# Patient Record
Sex: FEMALE | Race: WHITE | NOT HISPANIC OR LATINO | URBAN - METROPOLITAN AREA
[De-identification: names, ages, dates, MRNs, and addresses within clinical notes are randomized per-mention and may not be internally consistent; named-entity substitution may affect disease eponyms.]

---
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
## 2024-05-24 ENCOUNTER — APPOINTMENT (OUTPATIENT)
Dept: GENERAL RADIOLOGY | Facility: HOSPITAL | Age: 19
End: 2024-05-24
Payer: MEDICAID

## 2024-05-24 ENCOUNTER — HOSPITAL ENCOUNTER (EMERGENCY)
Facility: HOSPITAL | Age: 19
Discharge: HOME | End: 2024-05-24
Attending: EMERGENCY MEDICINE
Payer: MEDICAID

## 2024-05-24 VITALS
DIASTOLIC BLOOD PRESSURE: 75 MMHG | RESPIRATION RATE: 19 BRPM | OXYGEN SATURATION: 97 % | HEIGHT: 67 IN | TEMPERATURE: 98.5 F | BODY MASS INDEX: 27.34 KG/M2 | WEIGHT: 174.16 LBS | HEART RATE: 76 BPM | SYSTOLIC BLOOD PRESSURE: 107 MMHG

## 2024-05-24 DIAGNOSIS — J45.901 REACTIVE AIRWAY DISEASE WITH ACUTE EXACERBATION, UNSPECIFIED ASTHMA SEVERITY, UNSPECIFIED WHETHER PERSISTENT: Primary | ICD-10-CM

## 2024-05-24 PROCEDURE — 71045 X-RAY EXAM CHEST 1 VIEW: CPT

## 2024-05-24 PROCEDURE — 99283 EMERGENCY DEPT VISIT LOW MDM: CPT

## 2024-05-24 PROCEDURE — 94640 AIRWAY INHALATION TREATMENT: CPT

## 2024-05-24 PROCEDURE — 94799 UNLISTED PULMONARY SVC/PX: CPT

## 2024-05-24 RX ORDER — ALBUTEROL SULFATE 90 UG/1
2 AEROSOL, METERED RESPIRATORY (INHALATION) EVERY 4 HOURS PRN
Qty: 18 G | Refills: 0 | Status: SHIPPED | OUTPATIENT
Start: 2024-05-24

## 2024-05-24 RX ORDER — ALBUTEROL SULFATE 2.5 MG/3ML
2.5 SOLUTION RESPIRATORY (INHALATION) ONCE
Status: COMPLETED | OUTPATIENT
Start: 2024-05-24 | End: 2024-05-24

## 2024-05-24 RX ADMIN — ALBUTEROL SULFATE 2.5 MG: 2.5 SOLUTION RESPIRATORY (INHALATION) at 01:47

## 2024-05-24 NOTE — ED PROVIDER NOTES
"Subjective   History of Present Illness  18-year-old female presents for cough and wheezing going on for few days.  No fevers.  No chills.  Cough mostly nonproductive.  States had history of asthma as a child but has not had any problems with it in quite some time.  Review of Systems  See HPI.  No past medical history on file.    Allergies   Allergen Reactions    Penicillins Unknown - High Severity       No past surgical history on file.    No family history on file.    Social History     Socioeconomic History    Marital status: Single   Tobacco Use    Smoking status: Never    Smokeless tobacco: Never   Vaping Use    Vaping status: Former    Quit date: 6/19/2023   Substance and Sexual Activity    Alcohol use: Never    Drug use: Not Currently     Types: Marijuana    Sexual activity: Yes           Objective   Physical Exam  No acute distress, regular rate and rhythm, slight expiratory wheezes on right and none on left, good air movement bilaterally, no lower extremity edema, moist oral mucosa, no rhinorrhea present.  Procedures           ED Course      /75   Pulse 76   Temp 98.4 °F (36.9 °C)   Resp 20   Ht 170.2 cm (67\")   Wt 79 kg (174 lb 2.6 oz)   LMP 05/17/2024   SpO2 97%   BMI 27.28 kg/m²   Labs Reviewed - No data to display  Medications   albuterol (PROVENTIL) nebulizer solution 0.083% 2.5 mg/3mL (2.5 mg Nebulization Given 5/24/24 0147)     XR Chest 1 View    Result Date: 5/24/2024  Impression: No active disease Electronically Signed: Cedrick Alvarado MD  5/24/2024 1:38 AM EDT  Workstation ID: [id number]                                          Medical Decision Making  Problems Addressed:  Reactive airway disease with acute exacerbation, unspecified asthma severity, unspecified whether persistent: complicated acute illness or injury    Amount and/or Complexity of Data Reviewed  Radiology: ordered.    Risk  Prescription drug management.    My interpretation chest x-ray is no focal infiltrate or " pneumothorax.  See system for radiology interpretation.    Patient states wheezing resolved and she feels much better after breathing treatment.  Will DC with inhaler.  Reactive airway disease with unknown trigger.  Will place referral to PCP as patient does not currently have 1.    Final diagnoses:   Reactive airway disease with acute exacerbation, unspecified asthma severity, unspecified whether persistent       ED Disposition  ED Disposition       ED Disposition   Discharge    Condition   Stable    Comment   --               PATIENT CONNECTION - [facility] [id number] [phone]  In 3 days           Medication List        New Prescriptions      albuterol sulfate  (90 Base) MCG/ACT inhaler  Commonly known as: PROVENTIL HFA;VENTOLIN HFA;PROAIR HFA  Inhale 2 puffs Every 4 (Four) Hours As Needed for Wheezing.               Where to Get Your Medications        These medications were sent to [facility] #[id number] - [facility] [place] - [address] AT [address] OF [facility] [address] - [phone]  - [phone]   [address] [facility] [address] [facility] [place] [address]      Phone: [phone]   albuterol sulfate  (90 Base) MCG/ACT inhaler            Billy Bassett MD  05/25/24 1215

## 2024-06-13 ENCOUNTER — HOSPITAL ENCOUNTER (EMERGENCY)
Facility: HOSPITAL | Age: 19
Discharge: HOME | End: 2024-06-13
Payer: MEDICAID

## 2024-06-13 VITALS
WEIGHT: 172.18 LBS | HEART RATE: 85 BPM | RESPIRATION RATE: 20 BRPM | BODY MASS INDEX: 27.02 KG/M2 | HEIGHT: 67 IN | SYSTOLIC BLOOD PRESSURE: 108 MMHG | TEMPERATURE: 98.2 F | OXYGEN SATURATION: 98 % | DIASTOLIC BLOOD PRESSURE: 69 MMHG

## 2024-06-13 DIAGNOSIS — J45.909 ASTHMA, UNSPECIFIED ASTHMA SEVERITY, UNSPECIFIED WHETHER COMPLICATED, UNSPECIFIED WHETHER PERSISTENT: Primary | ICD-10-CM

## 2024-06-13 PROCEDURE — 93005 ELECTROCARDIOGRAM TRACING: CPT

## 2024-06-13 PROCEDURE — 99283 EMERGENCY DEPT VISIT LOW MDM: CPT

## 2024-06-13 PROCEDURE — 94640 AIRWAY INHALATION TREATMENT: CPT

## 2024-06-13 PROCEDURE — 94664 DEMO&/EVAL PT USE INHALER: CPT

## 2024-06-13 PROCEDURE — 94761 N-INVAS EAR/PLS OXIMETRY MLT: CPT

## 2024-06-13 PROCEDURE — 94799 UNLISTED PULMONARY SVC/PX: CPT

## 2024-06-13 RX ORDER — IPRATROPIUM BROMIDE AND ALBUTEROL SULFATE 2.5; .5 MG/3ML; MG/3ML
3 SOLUTION RESPIRATORY (INHALATION) ONCE
Status: COMPLETED | OUTPATIENT
Start: 2024-06-13 | End: 2024-06-13

## 2024-06-13 RX ORDER — ALBUTEROL SULFATE 90 UG/1
2 AEROSOL, METERED RESPIRATORY (INHALATION) EVERY 4 HOURS PRN
Qty: 6.7 G | Refills: 0 | Status: SHIPPED | OUTPATIENT
Start: 2024-06-13

## 2024-06-13 RX ADMIN — IPRATROPIUM BROMIDE AND ALBUTEROL SULFATE 3 ML: .5; 3 SOLUTION RESPIRATORY (INHALATION) at 07:49

## 2024-06-13 NOTE — DISCHARGE INSTRUCTIONS
Use your inhaler as needed for wheezing    Call the above numbers to establish with a new primary care provider along with pulmonary for follow-up and further management of your asthma    Return with any new or worsening symptoms

## 2024-06-13 NOTE — ED PROVIDER NOTES
Subjective   History of Present Illness  89-year-old female presents the ED with complaints of shortness of breath that started around 3 AM this morning.  States he has a history of asthma and this feels very similar to that.  Denies any chest pain, cough, congestion, fever, nausea, vomiting.  States she was recently here but has lost her inhaler.  Also denies any primary care provider        Review of Systems   Respiratory:  Positive for shortness of breath and wheezing.        Past Medical History:   Diagnosis Date    Asthma        Allergies   Allergen Reactions    Penicillins Unknown - High Severity       History reviewed. No pertinent surgical history.    History reviewed. No pertinent family history.    Social History     Socioeconomic History    Marital status: Single   Tobacco Use    Smoking status: Never    Smokeless tobacco: Never   Vaping Use    Vaping status: Former    Quit date: 6/19/2023   Substance and Sexual Activity    Alcohol use: Never    Drug use: Not Currently     Types: Marijuana    Sexual activity: Yes           Objective   Physical Exam  Vitals and nursing note reviewed.   Constitutional:       Appearance: Normal appearance.   HENT:      Head: Normocephalic and atraumatic.   Eyes:      Extraocular Movements: Extraocular movements intact.   Cardiovascular:      Rate and Rhythm: Normal rate and regular rhythm.      Pulses: Normal pulses.      Heart sounds: Normal heart sounds.   Pulmonary:      Effort: Pulmonary effort is normal.      Breath sounds: Wheezing present.      Comments: Expiratory wheezes bilaterally  Musculoskeletal:         General: Normal range of motion.      Cervical back: Normal range of motion.   Skin:     General: Skin is warm and dry.   Neurological:      Mental Status: She is alert and oriented to person, place, and time.   Psychiatric:         Mood and Affect: Mood normal.         Behavior: Behavior normal.         Thought Content: Thought content normal.         Judgment:  "Judgment normal.         Procedures      EKG independently interpreted by Dr. Melendez as sinus rhythm at a rate of 67 as compared to previous on 7/31/2023 that was sinus rhythm with a rate of 87.           ED Course      /69   Pulse 85   Temp 98.2 °F (36.8 °C) (Oral)   Resp 20   Ht 170.2 cm (67\")   Wt 78.1 kg (172 lb 2.9 oz)   LMP 05/17/2024   SpO2 98%   BMI 26.97 kg/m²   Labs Reviewed - No data to display  Medications   ipratropium-albuterol (DUO-NEB) nebulizer solution 3 mL (3 mL Nebulization Given 6/13/24 0749)     No radiology results for the last day                                         Medical Decision Making  Patient seen for the above complaint, shortness of breath with her asthma.  Patient states that she recently lost her inhaler.  Upon chart review patient was recently seen here on May 24 for this and was given a note and primary care follow-up.  Patient also received an x-ray at that time which showed no active disease.  New chest x-ray was considered however not felt to be emergently warranted as patient states this feels like her past asthma flares.  Also consider lab work however also not felt to be emergently warranted at this time.  Patient also had mild wheezing expiratory bilaterally.  Pre and post DuoNeb peak flows were obtained per respiratory, patient was 300 prior to DuoNeb and then 410 afterwards.  On reassessment patient states she feels significantly better, breath sounds clear bilaterally without wheezing present.  Reordered her albuterol inhaler to be used as needed along with primary care and pulmonary referrals for follow-up.  Patient verbalized understanding and were agreeable with disposition at this time.    I discussed findings with patient who voices understanding of discharge instructions, signs and symptoms requiring return to ED; discharged improved and in stable condition with follow up for re-evaluation.  This document is intended for medical expert use only. " Reading of this document by patients and/or patient's family without participating medical staff guidance may result in misinterpretation and unintended morbidity.  Any interpretation of such data is the responsibility of the patient and/or family member responsible for the patient in concert with their primary or specialist providers, not to be left for sources of online searches such as ????, [employer] or similar queries. Relying on these approaches to knowledge may result in misinterpretation, misguided goals of care and even death should patients or family members try recommendations outside of the realm of professional medical care in a supervised inpatient environment.     Risk  Prescription drug management.        Final diagnoses:   Asthma, unspecified asthma severity, unspecified whether complicated, unspecified whether persistent       ED Disposition  ED Disposition       ED Disposition   Discharge    Condition   Stable    Comment   --               family connections  [phone]  Schedule an appointment as soon as possible for a visit       Aquilino Parish MD  [address] [place] [address] [place] [address] [phone]    Schedule an appointment as soon as possible for a visit            Where to Get Your Medications        These medications were sent to [facility] [address] [place] [facility] [place] - [address] [phone] [place] [phone] [place]  [address] [facility] [place] [address]      Phone: [phone]   albuterol sulfate  (90 Base) MCG/ACT inhaler          Medication List      No changes were made to your prescriptions during this visit.            Sherri Hanna, APRN  06/13/24 5536

## 2024-06-14 LAB
QT INTERVAL: 376 MS
QTC INTERVAL: 396 MS

## 2024-07-06 ENCOUNTER — HOSPITAL ENCOUNTER (EMERGENCY)
Facility: HOSPITAL | Age: 19
Discharge: HOME | End: 2024-07-06
Attending: EMERGENCY MEDICINE
Payer: MEDICAID

## 2024-07-06 VITALS
RESPIRATION RATE: 15 BRPM | WEIGHT: 173.5 LBS | SYSTOLIC BLOOD PRESSURE: 123 MMHG | BODY MASS INDEX: 27.23 KG/M2 | OXYGEN SATURATION: 98 % | TEMPERATURE: 97.9 F | DIASTOLIC BLOOD PRESSURE: 74 MMHG | HEART RATE: 84 BPM | HEIGHT: 67 IN

## 2024-07-06 DIAGNOSIS — H66.92 LEFT OTITIS MEDIA, UNSPECIFIED OTITIS MEDIA TYPE: Primary | ICD-10-CM

## 2024-07-06 PROCEDURE — 99282 EMERGENCY DEPT VISIT SF MDM: CPT

## 2024-07-06 RX ORDER — AZITHROMYCIN 250 MG/1
TABLET, FILM COATED ORAL
Qty: 6 TABLET | Refills: 0 | Status: SHIPPED | OUTPATIENT
Start: 2024-07-06

## 2024-07-11 NOTE — DISCHARGE INSTRUCTIONS
Rest, drink plenty of fluids, stop smoking.  Return for increased shortness of breath, persistent vomiting, high fever or any other concerns   Yes

## 2024-09-05 ENCOUNTER — HOSPITAL ENCOUNTER (EMERGENCY)
Facility: HOSPITAL | Age: 19
Discharge: HOME | End: 2024-09-05
Attending: EMERGENCY MEDICINE
Payer: MEDICAID

## 2024-09-05 VITALS
OXYGEN SATURATION: 96 % | HEIGHT: 68 IN | HEART RATE: 88 BPM | DIASTOLIC BLOOD PRESSURE: 61 MMHG | TEMPERATURE: 98 F | SYSTOLIC BLOOD PRESSURE: 103 MMHG | BODY MASS INDEX: 26.1 KG/M2 | WEIGHT: 172.18 LBS | RESPIRATION RATE: 16 BRPM

## 2024-09-05 DIAGNOSIS — J45.901 MODERATE ASTHMA WITH EXACERBATION, UNSPECIFIED WHETHER PERSISTENT: Primary | ICD-10-CM

## 2024-09-05 DIAGNOSIS — U07.1 COVID-19: ICD-10-CM

## 2024-09-05 LAB
B PARAPERT DNA SPEC QL NAA+PROBE: NOT DETECTED
B PERT DNA SPEC QL NAA+PROBE: NOT DETECTED
C PNEUM DNA NPH QL NAA+NON-PROBE: NOT DETECTED
FLUAV SUBTYP SPEC NAA+PROBE: NOT DETECTED
FLUBV RNA ISLT QL NAA+PROBE: NOT DETECTED
HADV DNA SPEC NAA+PROBE: NOT DETECTED
HCOV 229E RNA SPEC QL NAA+PROBE: NOT DETECTED
HCOV HKU1 RNA SPEC QL NAA+PROBE: NOT DETECTED
HCOV NL63 RNA SPEC QL NAA+PROBE: NOT DETECTED
HCOV OC43 RNA SPEC QL NAA+PROBE: NOT DETECTED
HMPV RNA NPH QL NAA+NON-PROBE: NOT DETECTED
HPIV1 RNA ISLT QL NAA+PROBE: NOT DETECTED
HPIV2 RNA SPEC QL NAA+PROBE: NOT DETECTED
HPIV3 RNA NPH QL NAA+PROBE: NOT DETECTED
HPIV4 P GENE NPH QL NAA+PROBE: NOT DETECTED
M PNEUMO IGG SER IA-ACNC: NOT DETECTED
RHINOVIRUS RNA SPEC NAA+PROBE: DETECTED
RSV RNA NPH QL NAA+NON-PROBE: NOT DETECTED
SARS-COV-2 RNA NPH QL NAA+NON-PROBE: DETECTED

## 2024-09-05 PROCEDURE — 99283 EMERGENCY DEPT VISIT LOW MDM: CPT

## 2024-09-05 PROCEDURE — 63710000001 ONDANSETRON ODT 4 MG TABLET DISPERSIBLE: Performed by: EMERGENCY MEDICINE

## 2024-09-05 PROCEDURE — 0202U NFCT DS 22 TRGT SARS-COV-2: CPT | Performed by: EMERGENCY MEDICINE

## 2024-09-05 PROCEDURE — 94640 AIRWAY INHALATION TREATMENT: CPT

## 2024-09-05 PROCEDURE — 94799 UNLISTED PULMONARY SVC/PX: CPT

## 2024-09-05 PROCEDURE — 93005 ELECTROCARDIOGRAM TRACING: CPT | Performed by: EMERGENCY MEDICINE

## 2024-09-05 PROCEDURE — 63710000001 PREDNISONE PER 5 MG: Performed by: EMERGENCY MEDICINE

## 2024-09-05 PROCEDURE — 93005 ELECTROCARDIOGRAM TRACING: CPT

## 2024-09-05 RX ORDER — ALBUTEROL SULFATE 0.83 MG/ML
2.5 SOLUTION RESPIRATORY (INHALATION) EVERY 4 HOURS PRN
Qty: 90 ML | Refills: 0 | Status: SHIPPED | OUTPATIENT
Start: 2024-09-05

## 2024-09-05 RX ORDER — ACETAMINOPHEN 500 MG
1000 TABLET ORAL ONCE
Status: COMPLETED | OUTPATIENT
Start: 2024-09-05 | End: 2024-09-05

## 2024-09-05 RX ORDER — ALBUTEROL SULFATE 90 UG/1
2 AEROSOL, METERED RESPIRATORY (INHALATION) EVERY 4 HOURS PRN
Qty: 8 G | Refills: 3 | Status: SHIPPED | OUTPATIENT
Start: 2024-09-05

## 2024-09-05 RX ORDER — ALBUTEROL SULFATE 0.83 MG/ML
2.5 SOLUTION RESPIRATORY (INHALATION) ONCE
Status: COMPLETED | OUTPATIENT
Start: 2024-09-05 | End: 2024-09-05

## 2024-09-05 RX ORDER — PREDNISONE 20 MG/1
20 TABLET ORAL 2 TIMES DAILY
Qty: 10 TABLET | Refills: 0 | OUTPATIENT
Start: 2024-09-05 | End: 2024-09-07

## 2024-09-05 RX ORDER — ONDANSETRON 4 MG/1
4 TABLET, ORALLY DISINTEGRATING ORAL ONCE
Status: COMPLETED | OUTPATIENT
Start: 2024-09-05 | End: 2024-09-05

## 2024-09-05 RX ADMIN — PREDNISONE 60 MG: 50 TABLET ORAL at 04:09

## 2024-09-05 RX ADMIN — ALBUTEROL SULFATE 2.5 MG: 2.5 SOLUTION RESPIRATORY (INHALATION) at 04:12

## 2024-09-05 RX ADMIN — ACETAMINOPHEN 1000 MG: 500 TABLET, FILM COATED ORAL at 04:09

## 2024-09-05 RX ADMIN — ONDANSETRON 4 MG: 4 TABLET, ORALLY DISINTEGRATING ORAL at 04:09

## 2024-09-05 NOTE — ED PROVIDER NOTES
Subjective   History of Present Illness  19-year-old female with a history of asthma here with suspected exacerbation from URI for the past 2 days.  Patient has had some fever, headache, nausea, chest soreness from cough.      Review of Systems   Constitutional:  Positive for fever.   HENT:  Positive for congestion.    Respiratory:  Positive for cough, shortness of breath and wheezing.    Cardiovascular:  Positive for chest pain.   Gastrointestinal:  Positive for nausea.   Neurological:  Positive for headaches.       Past Medical History:   Diagnosis Date    Asthma        Allergies   Allergen Reactions    Penicillins Unknown - High Severity       No past surgical history on file.    No family history on file.    Social History     Socioeconomic History    Marital status: Single   Tobacco Use    Smoking status: Never    Smokeless tobacco: Never   Vaping Use    Vaping status: Former    Quit date: 6/19/2023   Substance and Sexual Activity    Alcohol use: Never    Drug use: Not Currently     Types: Marijuana    Sexual activity: Yes           Objective   Physical Exam  Constitutional:       Appearance: She is well-developed.   HENT:      Head: Normocephalic and atraumatic.      Nose: Congestion present.      Mouth/Throat:      Mouth: Mucous membranes are moist.      Pharynx: Oropharynx is clear.   Eyes:      Conjunctiva/sclera: Conjunctivae normal.      Pupils: Pupils are equal, round, and reactive to light.   Cardiovascular:      Rate and Rhythm: Normal rate and regular rhythm.   Pulmonary:      Effort: Pulmonary effort is normal.      Comments: Wheezes and rhonchi bilaterally  Musculoskeletal:      Comments: Calves nontender without edema bilaterally   Skin:     General: Skin is warm and dry.      Capillary Refill: Capillary refill takes less than 2 seconds.   Neurological:      General: No focal deficit present.      Mental Status: She is alert and oriented to person, place, and time.         Procedures           ED  Course                                             Medical Decision Making  On reevaluation lungs clear, patient feels much better found to have COVID with associated bronchospasm, will DC on steroids and albuterol    Problems Addressed:  COVID-19: complicated acute illness or injury  Moderate asthma with exacerbation, unspecified whether persistent: complicated acute illness or injury    Amount and/or Complexity of Data Reviewed  Labs: ordered.     Details: COVID-positive  ECG/medicine tests: ordered and independent interpretation performed.     Details: EKG interpretation: Normal sinus rhythm, rate 87, no acute ST elevation    Risk  OTC drugs.  Prescription drug management.        Final diagnoses:   Moderate asthma with exacerbation, unspecified whether persistent   COVID-19       ED Disposition  ED Disposition       ED Disposition   Discharge    Condition   Stable    Comment   --               PATIENT CONNECTION - [place] [address]  [phone]  Schedule an appointment as soon as possible for a visit            Medication List        New Prescriptions      Unable to find  Albuterol nebulizer  Dispense 1    Diagnosis asthma exacerbation/wheezing            Changed      * albuterol sulfate  (90 Base) MCG/ACT inhaler  Commonly known as: PROVENTIL HFA;VENTOLIN HFA;PROAIR HFA  Inhale 2 puffs Every 4 (Four) Hours As Needed for Wheezing.  What changed: Another medication with the same name was added. Make sure you understand how and when to take each.     * albuterol sulfate  (90 Base) MCG/ACT inhaler  Commonly known as: PROVENTIL HFA;VENTOLIN HFA;PROAIR HFA  Inhale 2 puffs Every 4 (Four) Hours As Needed for Wheezing or Shortness of Air.  What changed: You were already taking a medication with the same name, and this prescription was added. Make sure you understand how and when to take each.     * albuterol (2.5 MG/3ML) 0.083% nebulizer solution  Commonly known as: PROVENTIL  Take 2.5 mg by  nebulization Every 4 (Four) Hours As Needed for Wheezing or Shortness of Air.  What changed: You were already taking a medication with the same name, and this prescription was added. Make sure you understand how and when to take each.     * predniSONE 20 MG tablet  Commonly known as: DELTASONE  Take 1 tablet by mouth Daily.  What changed: Another medication with the same name was added. Make sure you understand how and when to take each.     * predniSONE 20 MG tablet  Commonly known as: DELTASONE  Take 1 tablet by mouth Daily.  What changed: Another medication with the same name was added. Make sure you understand how and when to take each.     * predniSONE 20 MG tablet  Commonly known as: DELTASONE  Take 1 tablet by mouth 2 (Two) Times a Day.  What changed: You were already taking a medication with the same name, and this prescription was added. Make sure you understand how and when to take each.           * This list has 6 medication(s) that are the same as other medications prescribed for you. Read the directions carefully, and ask your doctor or other care provider to review them with you.                   Where to Get Your Medications        These medications were sent to [facility] [address] [place] [facility] [place] - [address] [facility] [address] [phone] [facility] [phone]   [address] [facility] [place] [address]      Phone: [phone]   albuterol (2.5 MG/3ML) 0.083% nebulizer solution  albuterol sulfate  (90 Base) MCG/ACT inhaler  predniSONE 20 MG tablet       You can get these medications from any pharmacy    Bring a paper prescription for each of these medications  Unable to find            Sidney Reynolds MD  09/05/24 0778

## 2024-09-05 NOTE — Clinical Note
[facility] EMERGENCY DEPARTMENT  [address] [facility] [place] [address]  Phone: [phone]    Tio Eng was seen and treated in our emergency department on 9/5/2024.  She may return to work on 09/09/2024.         Thank you for choosing [facility].    Sidney Reynolds MD

## 2024-09-06 LAB
QT INTERVAL: 327 MS
QTC INTERVAL: 393 MS

## 2024-09-07 ENCOUNTER — HOSPITAL ENCOUNTER (EMERGENCY)
Facility: HOSPITAL | Age: 19
Discharge: HOME | End: 2024-09-07
Attending: EMERGENCY MEDICINE
Payer: MEDICAID

## 2024-09-07 ENCOUNTER — APPOINTMENT (OUTPATIENT)
Dept: GENERAL RADIOLOGY | Facility: HOSPITAL | Age: 19
End: 2024-09-07
Payer: MEDICAID

## 2024-09-07 VITALS
BODY MASS INDEX: 26.7 KG/M2 | HEART RATE: 129 BPM | SYSTOLIC BLOOD PRESSURE: 108 MMHG | RESPIRATION RATE: 20 BRPM | TEMPERATURE: 98.1 F | DIASTOLIC BLOOD PRESSURE: 59 MMHG | OXYGEN SATURATION: 99 % | HEIGHT: 68 IN | WEIGHT: 176.15 LBS

## 2024-09-07 DIAGNOSIS — J45.901 ASTHMA WITH ACUTE EXACERBATION, UNSPECIFIED ASTHMA SEVERITY, UNSPECIFIED WHETHER PERSISTENT: Primary | ICD-10-CM

## 2024-09-07 LAB
ALBUMIN SERPL-MCNC: 4.2 G/DL (ref 3.5–5.2)
ALBUMIN/GLOB SERPL: 1.8 G/DL
ALP SERPL-CCNC: 59 U/L (ref 39–117)
ALT SERPL W P-5'-P-CCNC: 25 U/L (ref 1–33)
ANION GAP SERPL CALCULATED.3IONS-SCNC: 11 MMOL/L (ref 5–15)
AST SERPL-CCNC: 30 U/L (ref 1–32)
BASOPHILS # BLD AUTO: 0.03 10*3/MM3 (ref 0–0.2)
BASOPHILS NFR BLD AUTO: 0.3 % (ref 0–1.5)
BILIRUB SERPL-MCNC: 0.3 MG/DL (ref 0–1.2)
BUN SERPL-MCNC: 6 MG/DL (ref 6–20)
BUN/CREAT SERPL: 8.7 (ref 7–25)
CALCIUM SPEC-SCNC: 9.1 MG/DL (ref 8.6–10.5)
CHLORIDE SERPL-SCNC: 105 MMOL/L (ref 98–107)
CO2 SERPL-SCNC: 23 MMOL/L (ref 22–29)
CREAT SERPL-MCNC: 0.69 MG/DL (ref 0.57–1)
DEPRECATED RDW RBC AUTO: 44.3 FL (ref 37–54)
EGFRCR SERPLBLD CKD-EPI 2021: 127.6 ML/MIN/1.73
EOSINOPHIL # BLD AUTO: 0.34 10*3/MM3 (ref 0–0.4)
EOSINOPHIL NFR BLD AUTO: 3.1 % (ref 0.3–6.2)
ERYTHROCYTE [DISTWIDTH] IN BLOOD BY AUTOMATED COUNT: 13.3 % (ref 12.3–15.4)
GLOBULIN UR ELPH-MCNC: 2.4 GM/DL
GLUCOSE SERPL-MCNC: 82 MG/DL (ref 65–99)
HCT VFR BLD AUTO: 37.4 % (ref 34–46.6)
HGB BLD-MCNC: 12.1 G/DL (ref 12–15.9)
IMM GRANULOCYTES # BLD AUTO: 0.05 10*3/MM3 (ref 0–0.05)
IMM GRANULOCYTES NFR BLD AUTO: 0.5 % (ref 0–0.5)
LYMPHOCYTES # BLD AUTO: 2.01 10*3/MM3 (ref 0.7–3.1)
LYMPHOCYTES NFR BLD AUTO: 18.5 % (ref 19.6–45.3)
MCH RBC QN AUTO: 29.2 PG (ref 26.6–33)
MCHC RBC AUTO-ENTMCNC: 32.4 G/DL (ref 31.5–35.7)
MCV RBC AUTO: 90.1 FL (ref 79–97)
MONOCYTES # BLD AUTO: 0.92 10*3/MM3 (ref 0.1–0.9)
MONOCYTES NFR BLD AUTO: 8.5 % (ref 5–12)
NEUTROPHILS NFR BLD AUTO: 69.1 % (ref 42.7–76)
NEUTROPHILS NFR BLD AUTO: 7.52 10*3/MM3 (ref 1.7–7)
NRBC BLD AUTO-RTO: 0 /100 WBC (ref 0–0.2)
PLATELET # BLD AUTO: 242 10*3/MM3 (ref 140–450)
PMV BLD AUTO: 10.4 FL (ref 6–12)
POTASSIUM SERPL-SCNC: 3.9 MMOL/L (ref 3.5–5.2)
PROT SERPL-MCNC: 6.6 G/DL (ref 6–8.5)
RBC # BLD AUTO: 4.15 10*6/MM3 (ref 3.77–5.28)
SODIUM SERPL-SCNC: 139 MMOL/L (ref 136–145)
WBC NRBC COR # BLD AUTO: 10.87 10*3/MM3 (ref 3.4–10.8)

## 2024-09-07 PROCEDURE — 99284 EMERGENCY DEPT VISIT MOD MDM: CPT

## 2024-09-07 PROCEDURE — 93005 ELECTROCARDIOGRAM TRACING: CPT | Performed by: EMERGENCY MEDICINE

## 2024-09-07 PROCEDURE — 71045 X-RAY EXAM CHEST 1 VIEW: CPT

## 2024-09-07 PROCEDURE — 93005 ELECTROCARDIOGRAM TRACING: CPT

## 2024-09-07 PROCEDURE — 25010000002 DEXAMETHASONE SODIUM PHOSPHATE 10 MG/ML SOLUTION: Performed by: EMERGENCY MEDICINE

## 2024-09-07 PROCEDURE — 80053 COMPREHEN METABOLIC PANEL: CPT | Performed by: EMERGENCY MEDICINE

## 2024-09-07 PROCEDURE — 85025 COMPLETE CBC W/AUTO DIFF WBC: CPT | Performed by: EMERGENCY MEDICINE

## 2024-09-07 PROCEDURE — 96374 THER/PROPH/DIAG INJ IV PUSH: CPT

## 2024-09-07 PROCEDURE — 94799 UNLISTED PULMONARY SVC/PX: CPT

## 2024-09-07 PROCEDURE — 94640 AIRWAY INHALATION TREATMENT: CPT

## 2024-09-07 RX ORDER — ALBUTEROL SULFATE 90 UG/1
6 AEROSOL, METERED RESPIRATORY (INHALATION) ONCE
Status: COMPLETED | OUTPATIENT
Start: 2024-09-07 | End: 2024-09-07

## 2024-09-07 RX ORDER — DEXAMETHASONE SODIUM PHOSPHATE 10 MG/ML
10 INJECTION, SOLUTION INTRAMUSCULAR; INTRAVENOUS ONCE
Status: COMPLETED | OUTPATIENT
Start: 2024-09-07 | End: 2024-09-07

## 2024-09-07 RX ORDER — ALBUTEROL SULFATE 0.83 MG/ML
10 SOLUTION RESPIRATORY (INHALATION)
Status: DISCONTINUED | OUTPATIENT
Start: 2024-09-07 | End: 2024-09-07

## 2024-09-07 RX ORDER — METHYLPREDNISOLONE 4 MG
TABLET, DOSE PACK ORAL
Qty: 21 TABLET | Refills: 0 | Status: SHIPPED | OUTPATIENT
Start: 2024-09-07

## 2024-09-07 RX ORDER — SODIUM CHLORIDE 0.9 % (FLUSH) 0.9 %
10 SYRINGE (ML) INJECTION AS NEEDED
Status: DISCONTINUED | OUTPATIENT
Start: 2024-09-07 | End: 2024-09-07 | Stop reason: HOSPADM

## 2024-09-07 RX ORDER — ALBUTEROL SULFATE 0.83 MG/ML
10 SOLUTION RESPIRATORY (INHALATION)
Status: COMPLETED | OUTPATIENT
Start: 2024-09-07 | End: 2024-09-07

## 2024-09-07 RX ADMIN — DEXAMETHASONE SODIUM PHOSPHATE 10 MG: 10 INJECTION, SOLUTION INTRAMUSCULAR; INTRAVENOUS at 11:09

## 2024-09-07 RX ADMIN — ALBUTEROL SULFATE 6 PUFF: 108 AEROSOL, METERED RESPIRATORY (INHALATION) at 11:01

## 2024-09-07 RX ADMIN — ALBUTEROL SULFATE 10 MG: 2.5 SOLUTION RESPIRATORY (INHALATION) at 11:26

## 2024-09-07 NOTE — ED PROVIDER NOTES
"Subjective   History of Present Illness  19-year-old female presents for concern for asthma exacerbation.  Has had more wheezing and coughing.  Been using her albuterol every 4-6 hours and not helping.  Diagnosed with COVID and RSV 2 days ago.  Review of Systems  See HPI.  Past Medical History:   Diagnosis Date    Asthma        Allergies   Allergen Reactions    Penicillins Unknown - High Severity       No past surgical history on file.    No family history on file.    Social History     Socioeconomic History    Marital status: Single   Tobacco Use    Smoking status: Never    Smokeless tobacco: Never   Vaping Use    Vaping status: Former    Quit date: 6/19/2023   Substance and Sexual Activity    Alcohol use: Never    Drug use: Not Currently     Types: Marijuana    Sexual activity: Yes           Objective   Physical Exam  No acute distress, mild tachypnea, bilateral expiratory wheezes and rales, erythematous oropharynx with midline uvula no mass effect no exudate, tachycardic rate, abdomen soft nontender.  Procedures           ED Course      /59   Pulse (!) 129   Temp 98.1 °F (36.7 °C) (Oral)   Resp 20   Ht 172.7 cm (68\")   Wt 79.9 kg (176 lb 2.4 oz)   LMP 08/21/2024 (Approximate)   SpO2 99%   BMI 26.78 kg/m²   Labs Reviewed   CBC WITH AUTO DIFFERENTIAL - Abnormal; Notable for the following components:       Result Value    WBC 10.87 (*)     Lymphocyte % 18.5 (*)     Neutrophils, Absolute 7.52 (*)     Monocytes, Absolute 0.92 (*)     All other components within normal limits   COMPREHENSIVE METABOLIC PANEL    Narrative:     GFR Normal >60  Chronic Kidney Disease <60  Kidney Failure <15     CBC AND DIFFERENTIAL    Narrative:     The following orders were created for panel order CBC & Differential.  Procedure                               Abnormality         Status                     ---------                               -----------         ------                     CBC Auto Differential[[id number]]     "    Abnormal            Final result                 Please view results for these tests on the individual orders.     Medications   sodium chloride 0.9 % flush 10 mL (has no administration in time range)   dexAMETHasone sodium phosphate injection 10 mg (10 mg Intravenous Given 9/7/24 1109)   albuterol sulfate HFA (PROVENTIL HFA;VENTOLIN HFA;PROAIR HFA) inhaler 6 puff (6 puffs Inhalation Given 9/7/24 1101)   albuterol (PROVENTIL) nebulizer solution 0.083% 2.5 mg/3mL (10 mg Nebulization Given 9/7/24 1126)     XR Chest 1 View    Result Date: 9/7/2024  Impression: No active disease. Electronically Signed: Sathya Ruelas MD  9/7/2024 10:59 AM EDT  Workstation ID: [id number]                                          Medical Decision Making  Problems Addressed:  Asthma with acute exacerbation, unspecified asthma severity, unspecified whether persistent: complicated acute illness or injury    Amount and/or Complexity of Data Reviewed  Labs: ordered.  Radiology: ordered.  ECG/medicine tests: ordered.    Risk  Prescription drug management.      My interpretation of chest x-ray is no focal infiltrate or pneumothorax.  See system for radiology interpretation.    Wheezing resolved here after hour-long nebulizer treatment.  Patient feels improved.  Given dose of Decadron here.  Will change to different steroid.  Reassuring O2 saturations.  Will DC.  Return ER precautions discussed in detail with patient and family.    Final diagnoses:   Asthma with acute exacerbation, unspecified asthma severity, unspecified whether persistent       ED Disposition  ED Disposition       ED Disposition   Discharge    Condition   Stable    Comment   --               PATIENT CONNECTION - [facility] [address]  [phone]  In 3 days           Medication List        New Prescriptions      methylPREDNISolone 4 MG dose pack  Commonly known as: MEDROL  Take as directed on package instructions.            Stop      predniSONE 20 MG tablet  Commonly  known as: DELTASONE               Where to Get Your Medications        These medications were sent to [facility] [address] [place] [facility] - [facility] [place] - [address] - [phone]  - [phone] FX  [address] [facility] [place] [address]      Phone: [phone]   methylPREDNISolone 4 MG dose pack            Billy Bassett MD  09/07/24 4797

## 2024-09-07 NOTE — Clinical Note
[facility] EMERGENCY DEPARTMENT  [address] [place] [address]  Phone: [phone]    Tio Eng was seen and treated in our emergency department on 9/7/2024.  She may return to work on 09/11/2024.         Thank you for choosing [facility].    Billy Bassett MD

## 2024-09-08 LAB
QT INTERVAL: 344 MS
QTC INTERVAL: 398 MS

## 2025-02-01 NOTE — Clinical Note
[facility] EMERGENCY DEPARTMENT  [address] [place] [address]  Phone: [phone]    Tio Eng was seen and treated in our emergency department on 8/18/2023.  She may return to work on 08/19/2023.         Thank you for choosing [facility].    Varghese Quintana MD       80 y/o M with PMH of paroxysmal atrial fibrillation (on Eliquis and metoprolol succ 25mg BID since 10/2024), prostate cancer (with mets to bone, s/p 3 rounds of chemo, PSA downtrending, now on oral chemo) who presented with SOB and tachycardia seen on Apple Watch up to 140s per daughter. As of 01/24/2024 metop succ increased from 12.5 to 25mg BID due to HR 110s-120s. HR initially in 130s-140s in ER today, reduced to 110s-120s with IV metoprolol 5mg given by ER, diltiazem 10mg IV. Reports had some SOB and lethargy for the last several days- 1 week. Patient wears an Apple Watch and daughter showed me on her phone that his heart rate varies from 60s-140s. Patient recently restarted metoprolol since 10/2024 admission; previously was stopped due to hypotension and fatigue. Says he is about 95% compliant with the Eliquis, skipped a few doses last week due to the fatigue and thought the Eliquis was causing it.    1. Afib with RVR  2. Prostate CA s/p 3 rounds of Chemo    - Tele remains in  AFib with rates of 90-120s  - s/p ALTON CV 1/31, 200J x 2converted to sinus for 4 beats but then ERAF   - Continue Amiodarone loading 400mg tid for now, until repeat cardioversion next week once loaded with Amio  - Continue uninterrupted anticoagulation with Eliquis 5mg bid  - TSH level 3.54  - On Metoprolol Tartrate 25mg q6h for rate control  - Continue telemetry monitoring  - Keep K 4.0, Mg 2.0, supplement as needed    #[phone]

## 2025-03-28 ENCOUNTER — APPOINTMENT (OUTPATIENT)
Dept: GENERAL RADIOLOGY | Facility: HOSPITAL | Age: 20
End: 2025-03-28
Payer: MEDICAID

## 2025-03-28 ENCOUNTER — HOSPITAL ENCOUNTER (EMERGENCY)
Facility: HOSPITAL | Age: 20
Discharge: HOME | End: 2025-03-28
Attending: EMERGENCY MEDICINE
Payer: MEDICAID

## 2025-03-28 VITALS
TEMPERATURE: 97.6 F | OXYGEN SATURATION: 97 % | RESPIRATION RATE: 16 BRPM | BODY MASS INDEX: 25.55 KG/M2 | SYSTOLIC BLOOD PRESSURE: 114 MMHG | WEIGHT: 162.8 LBS | HEART RATE: 79 BPM | DIASTOLIC BLOOD PRESSURE: 55 MMHG | HEIGHT: 67 IN

## 2025-03-28 DIAGNOSIS — J45.901 MILD ASTHMA WITH EXACERBATION, UNSPECIFIED WHETHER PERSISTENT: Primary | ICD-10-CM

## 2025-03-28 LAB
ANION GAP SERPL CALCULATED.3IONS-SCNC: 11.4 MMOL/L (ref 5–15)
APTT PPP: 28.4 SECONDS (ref 22.7–35.4)
BASOPHILS # BLD AUTO: 0.05 10*3/MM3 (ref 0–0.2)
BASOPHILS NFR BLD AUTO: 0.6 % (ref 0–1.5)
BUN SERPL-MCNC: 7 MG/DL (ref 6–20)
BUN/CREAT SERPL: 9.2 (ref 7–25)
CALCIUM SPEC-SCNC: 9.7 MG/DL (ref 8.6–10.5)
CHLORIDE SERPL-SCNC: 103 MMOL/L (ref 98–107)
CO2 SERPL-SCNC: 22.6 MMOL/L (ref 22–29)
CREAT SERPL-MCNC: 0.76 MG/DL (ref 0.57–1)
D DIMER PPP FEU-MCNC: <0.27 MCGFEU/ML (ref 0–0.5)
DEPRECATED RDW RBC AUTO: 41.2 FL (ref 37–54)
EGFRCR SERPLBLD CKD-EPI 2021: 115.2 ML/MIN/1.73
EOSINOPHIL # BLD AUTO: 0.84 10*3/MM3 (ref 0–0.4)
EOSINOPHIL NFR BLD AUTO: 9.8 % (ref 0.3–6.2)
ERYTHROCYTE [DISTWIDTH] IN BLOOD BY AUTOMATED COUNT: 12.3 % (ref 12.3–15.4)
GEN 5 1HR TROPONIN T REFLEX: <6 NG/L
GLUCOSE SERPL-MCNC: 92 MG/DL (ref 65–99)
HCT VFR BLD AUTO: 43 % (ref 34–46.6)
HGB BLD-MCNC: 13.9 G/DL (ref 12–15.9)
IMM GRANULOCYTES # BLD AUTO: 0.03 10*3/MM3 (ref 0–0.05)
IMM GRANULOCYTES NFR BLD AUTO: 0.3 % (ref 0–0.5)
INR PPP: 1.12 (ref 0.9–1.1)
LYMPHOCYTES # BLD AUTO: 2.77 10*3/MM3 (ref 0.7–3.1)
LYMPHOCYTES NFR BLD AUTO: 32.2 % (ref 19.6–45.3)
MCH RBC QN AUTO: 29.4 PG (ref 26.6–33)
MCHC RBC AUTO-ENTMCNC: 32.3 G/DL (ref 31.5–35.7)
MCV RBC AUTO: 91.1 FL (ref 79–97)
MONOCYTES # BLD AUTO: 0.81 10*3/MM3 (ref 0.1–0.9)
MONOCYTES NFR BLD AUTO: 9.4 % (ref 5–12)
NEUTROPHILS NFR BLD AUTO: 4.11 10*3/MM3 (ref 1.7–7)
NEUTROPHILS NFR BLD AUTO: 47.7 % (ref 42.7–76)
NRBC BLD AUTO-RTO: 0 /100 WBC (ref 0–0.2)
PLATELET # BLD AUTO: 272 10*3/MM3 (ref 140–450)
PMV BLD AUTO: 11.5 FL (ref 6–12)
POTASSIUM SERPL-SCNC: 4.1 MMOL/L (ref 3.5–5.2)
PROTHROMBIN TIME: 14.3 SECONDS (ref 11.7–14.2)
RBC # BLD AUTO: 4.72 10*6/MM3 (ref 3.77–5.28)
SODIUM SERPL-SCNC: 137 MMOL/L (ref 136–145)
TROPONIN T NUMERIC DELTA: NORMAL
TROPONIN T SERPL HS-MCNC: 6 NG/L
WBC NRBC COR # BLD AUTO: 8.61 10*3/MM3 (ref 3.4–10.8)

## 2025-03-28 PROCEDURE — 94799 UNLISTED PULMONARY SVC/PX: CPT

## 2025-03-28 PROCEDURE — 96374 THER/PROPH/DIAG INJ IV PUSH: CPT

## 2025-03-28 PROCEDURE — 71045 X-RAY EXAM CHEST 1 VIEW: CPT

## 2025-03-28 PROCEDURE — 85610 PROTHROMBIN TIME: CPT | Performed by: EMERGENCY MEDICINE

## 2025-03-28 PROCEDURE — 80048 BASIC METABOLIC PNL TOTAL CA: CPT | Performed by: EMERGENCY MEDICINE

## 2025-03-28 PROCEDURE — 25010000002 METHYLPREDNISOLONE PER 125 MG: Performed by: EMERGENCY MEDICINE

## 2025-03-28 PROCEDURE — 94640 AIRWAY INHALATION TREATMENT: CPT

## 2025-03-28 PROCEDURE — 99284 EMERGENCY DEPT VISIT MOD MDM: CPT

## 2025-03-28 PROCEDURE — 85730 THROMBOPLASTIN TIME PARTIAL: CPT | Performed by: EMERGENCY MEDICINE

## 2025-03-28 PROCEDURE — 85025 COMPLETE CBC W/AUTO DIFF WBC: CPT | Performed by: EMERGENCY MEDICINE

## 2025-03-28 PROCEDURE — 84484 ASSAY OF TROPONIN QUANT: CPT | Performed by: EMERGENCY MEDICINE

## 2025-03-28 PROCEDURE — 36415 COLL VENOUS BLD VENIPUNCTURE: CPT

## 2025-03-28 PROCEDURE — 85379 FIBRIN DEGRADATION QUANT: CPT | Performed by: EMERGENCY MEDICINE

## 2025-03-28 RX ORDER — IPRATROPIUM BROMIDE AND ALBUTEROL SULFATE 2.5; .5 MG/3ML; MG/3ML
3 SOLUTION RESPIRATORY (INHALATION) ONCE
Status: COMPLETED | OUTPATIENT
Start: 2025-03-28 | End: 2025-03-28

## 2025-03-28 RX ORDER — PREDNISONE 20 MG/1
60 TABLET ORAL DAILY
Qty: 15 TABLET | Refills: 0 | Status: SHIPPED | OUTPATIENT
Start: 2025-03-28

## 2025-03-28 RX ORDER — METHYLPREDNISOLONE SODIUM SUCCINATE 125 MG/2ML
125 INJECTION, POWDER, LYOPHILIZED, FOR SOLUTION INTRAMUSCULAR; INTRAVENOUS ONCE
Status: COMPLETED | OUTPATIENT
Start: 2025-03-28 | End: 2025-03-28

## 2025-03-28 RX ORDER — ALBUTEROL SULFATE 90 UG/1
2 INHALANT RESPIRATORY (INHALATION) EVERY 4 HOURS PRN
Qty: 6.7 G | Refills: 0 | Status: SHIPPED | OUTPATIENT
Start: 2025-03-28

## 2025-03-28 RX ADMIN — IPRATROPIUM BROMIDE AND ALBUTEROL SULFATE 3 ML: .5; 3 SOLUTION RESPIRATORY (INHALATION) at 03:45

## 2025-03-28 RX ADMIN — METHYLPREDNISOLONE SODIUM SUCCINATE 125 MG: 125 INJECTION, POWDER, FOR SOLUTION INTRAMUSCULAR; INTRAVENOUS at 03:29

## 2025-03-28 NOTE — ED PROVIDER NOTES
Subjective   History of Present Illness  Chief complaint: Patient is a 19-year-old with history of asthma.  She states she began to have asthma over the last couple years after vaping.  She does not smoke or vape anymore.  She has never been intubated.  She was around a cat and dusting through the day today.  She became short of breath and went to get her inhaler and it was empty.  With persisting symptoms she presented here.  She has no chest pain.  No fever.  She does cough occasionally.  This feels similar to her asthma exacerbation in the past.  She has never had a blood clot in her leg or her lungs.    Context:    Duration:    Timing:    Severity:    Associated Symptoms:        PCP:  LMP:      Review of Systems   Constitutional:  Negative for fever.   HENT: Negative.     Respiratory:  Positive for cough and shortness of breath.    Cardiovascular:  Negative for chest pain.   Gastrointestinal: Negative.    Genitourinary: Negative.        Past Medical History:   Diagnosis Date    Asthma        Allergies   Allergen Reactions    Penicillins Unknown - High Severity       No past surgical history on file.    No family history on file.    Social History     Socioeconomic History    Marital status: Single   Tobacco Use    Smoking status: Never    Smokeless tobacco: Never   Vaping Use    Vaping status: Former    Quit date: 6/19/2023   Substance and Sexual Activity    Alcohol use: Never    Drug use: Not Currently     Types: Marijuana    Sexual activity: Yes           Objective   Physical Exam  Vitals and nursing note reviewed.   HENT:      Head: Normocephalic and atraumatic.   Cardiovascular:      Rate and Rhythm: Normal rate and regular rhythm.      Heart sounds: Normal heart sounds.   Pulmonary:      Effort: No respiratory distress.      Breath sounds: Decreased air movement present.   Musculoskeletal:         General: No swelling.      Cervical back: Normal range of motion.   Skin:     General: Skin is warm and dry.    Neurological:      Mental Status: She is alert and oriented to person, place, and time.   Psychiatric:         Mood and Affect: Mood normal.         Thought Content: Thought content normal.         Procedures           ED Course                                           Results for orders placed or performed during the hospital encounter of 03/28/25   Basic Metabolic Panel    Collection Time: 03/28/25  3:20 AM    Specimen: Blood   Result Value Ref Range    Glucose 92 65 - 99 mg/dL    BUN 7 6 - 20 mg/dL    Creatinine 0.76 0.57 - 1.00 mg/dL    Sodium 137 136 - 145 mmol/L    Potassium 4.1 3.5 - 5.2 mmol/L    Chloride 103 98 - 107 mmol/L    CO2 22.6 22.0 - 29.0 mmol/L    Calcium 9.7 8.6 - 10.5 mg/dL    BUN/Creatinine Ratio 9.2 7.0 - 25.0    Anion Gap 11.4 5.0 - 15.0 mmol/L    eGFR 115.2 >60.0 mL/min/1.73   High Sensitivity Troponin T    Collection Time: 03/28/25  3:20 AM    Specimen: Blood   Result Value Ref Range    HS Troponin T 6 <14 ng/L   Protime-INR    Collection Time: 03/28/25  3:20 AM    Specimen: Blood   Result Value Ref Range    Protime 14.3 (H) 11.7 - 14.2 Seconds    INR 1.12 (H) 0.90 - 1.10   aPTT    Collection Time: 03/28/25  3:20 AM    Specimen: Blood   Result Value Ref Range    PTT 28.4 22.7 - 35.4 seconds   D-dimer, Quantitative    Collection Time: 03/28/25  3:20 AM    Specimen: Blood   Result Value Ref Range    D-Dimer, Quantitative <0.27 0.00 - 0.50 MCGFEU/mL   CBC Auto Differential    Collection Time: 03/28/25  3:20 AM    Specimen: Blood   Result Value Ref Range    WBC 8.61 3.40 - 10.80 10*3/mm3    RBC 4.72 3.77 - 5.28 10*6/mm3    Hemoglobin 13.9 12.0 - 15.9 g/dL    Hematocrit 43.0 34.0 - 46.6 %    MCV 91.1 79.0 - 97.0 fL    MCH 29.4 26.6 - 33.0 pg    MCHC 32.3 31.5 - 35.7 g/dL    RDW 12.3 12.3 - 15.4 %    RDW-SD 41.2 37.0 - 54.0 fl    MPV 11.5 6.0 - 12.0 fL    Platelets 272 140 - 450 10*3/mm3    Neutrophil % 47.7 42.7 - 76.0 %    Lymphocyte % 32.2 19.6 - 45.3 %    Monocyte % 9.4 5.0 - 12.0 %     Eosinophil % 9.8 (H) 0.3 - 6.2 %    Basophil % 0.6 0.0 - 1.5 %    Immature Grans % 0.3 0.0 - 0.5 %    Neutrophils, Absolute 4.11 1.70 - 7.00 10*3/mm3    Lymphocytes, Absolute 2.77 0.70 - 3.10 10*3/mm3    Monocytes, Absolute 0.81 0.10 - 0.90 10*3/mm3    Eosinophils, Absolute 0.84 (H) 0.00 - 0.40 10*3/mm3    Basophils, Absolute 0.05 0.00 - 0.20 10*3/mm3    Immature Grans, Absolute 0.03 0.00 - 0.05 10*3/mm3    nRBC 0.0 0.0 - 0.2 /100 WBC   High Sensitivity Troponin T 1Hr    Collection Time: 03/28/25  4:28 AM    Specimen: Blood   Result Value Ref Range    HS Troponin T <6 <14 ng/L    Troponin T Numeric Delta       XR Chest 1 View  Result Date: 3/28/2025  Impression: No acute cardiopulmonary process. Electronically Signed: Jinny Mckay MD  3/28/2025 3:45 AM EDT  Workstation ID: [id number]                Medical Decision Making  Patient was seen and evaluated for the above problem  Differential diagnosis includes but is not limited to asthma, pneumothorax, CHF, ACS    Patient did have known triggers when she began to become short of breath.  She really did not have any significant wheeze here her breath sounds were diminished however.  EKG interpreted by myself sinus rhythm rate of 74.  Chest x-ray shows no signs of pneumothorax or emergent finding.  She did receive DuoNeb and Solu-Medrol here in the Emergency Department.  She is resting much more comfortably.  She states improvement.  Her D-dimer is normal and she is not tachycardic or hypoxic.  I do not think he has PE.  Her troponin is negative.  Chest x-ray was clear.  She will be discharged with steroid as well as albuterol inhaler for return for worsening symptoms signs or concerns.  She verbalized understanding is okay with this.      Problems Addressed:  Mild asthma with exacerbation, unspecified whether persistent: complicated acute illness or injury    Amount and/or Complexity of Data Reviewed  Labs: ordered. Decision-making details documented in ED Course.      Details: Labs reviewed by myself  Radiology: ordered and independent interpretation performed.     Details: X-ray reviewed by myself as above  ECG/medicine tests: ordered and independent interpretation performed.     Details: EKG interpreted by myself sinus rhythm rate 74    Risk  Prescription drug management.  Decision regarding hospitalization.        Final diagnoses:   None     Asthma exacerbation    ED Disposition  ED Disposition       None            No follow-up provider specified.       Medication List      No changes were made to your prescriptions during this visit.            Tc Melendez,   03/28/25 0519

## 2025-06-10 ENCOUNTER — APPOINTMENT (OUTPATIENT)
Dept: GENERAL RADIOLOGY | Facility: HOSPITAL | Age: 20
End: 2025-06-10
Payer: MEDICAID

## 2025-06-10 ENCOUNTER — HOSPITAL ENCOUNTER (EMERGENCY)
Facility: HOSPITAL | Age: 20
Discharge: HOME | End: 2025-06-11
Admitting: EMERGENCY MEDICINE
Payer: MEDICAID

## 2025-06-10 VITALS
RESPIRATION RATE: 15 BRPM | TEMPERATURE: 98 F | DIASTOLIC BLOOD PRESSURE: 68 MMHG | BODY MASS INDEX: 25.43 KG/M2 | SYSTOLIC BLOOD PRESSURE: 101 MMHG | HEIGHT: 67 IN | WEIGHT: 162.04 LBS | OXYGEN SATURATION: 98 % | HEART RATE: 97 BPM

## 2025-06-10 DIAGNOSIS — M25.511 ACUTE PAIN OF RIGHT SHOULDER: Primary | ICD-10-CM

## 2025-06-10 LAB — B-HCG UR QL: NEGATIVE

## 2025-06-10 PROCEDURE — 99283 EMERGENCY DEPT VISIT LOW MDM: CPT

## 2025-06-10 PROCEDURE — 73030 X-RAY EXAM OF SHOULDER: CPT

## 2025-06-10 PROCEDURE — 81025 URINE PREGNANCY TEST: CPT

## 2025-06-10 RX ORDER — IBUPROFEN 400 MG/1
800 TABLET, FILM COATED ORAL ONCE
Status: COMPLETED | OUTPATIENT
Start: 2025-06-10 | End: 2025-06-10

## 2025-06-10 RX ADMIN — IBUPROFEN 800 MG: 400 TABLET, FILM COATED ORAL at 23:22

## 2025-06-11 RX ORDER — IBUPROFEN 800 MG/1
800 TABLET, FILM COATED ORAL EVERY 8 HOURS PRN
Qty: 12 TABLET | Refills: 0 | Status: SHIPPED | OUTPATIENT
Start: 2025-06-11

## 2025-06-11 NOTE — DISCHARGE INSTRUCTIONS
Tylenol and ibuprofen as needed for any pain or discomfort.    Establish care with a primary care provider, call to make an appointment.  If the pain continues, follow-up with orthopedics by calling the number listed below    Return to the ER for any new or worsening symptoms.

## 2025-06-11 NOTE — ED PROVIDER NOTES
Subjective   Chief Complaint   Patient presents with    Shoulder Pain       History of Present Illness  Patient is a 19-year-old young lady who arrives today with reports that about 2 months ago her right shoulder popped out of socket and she replaced it and is having pain in the right shoulder since then.  Has not followed up with anybody or seen a specialist.  Did not see anybody at the time of reported dislocation.  Review of Systems   Musculoskeletal:  Positive for arthralgias.       Past Medical History:   Diagnosis Date    Asthma        Allergies   Allergen Reactions    Penicillins Unknown - High Severity       No past surgical history on file.    No family history on file.    Social History     Socioeconomic History    Marital status: Single   Tobacco Use    Smoking status: Never    Smokeless tobacco: Never   Vaping Use    Vaping status: Former    Quit date: 6/19/2023   Substance and Sexual Activity    Alcohol use: Never    Drug use: Not Currently     Types: Marijuana    Sexual activity: Yes           Objective   Physical Exam  Vitals and nursing note reviewed.   Constitutional:       General: She is not in acute distress.     Appearance: Normal appearance. She is normal weight. She is not ill-appearing, toxic-appearing or diaphoretic.   HENT:      Head: Normocephalic and atraumatic.      Right Ear: External ear normal.      Left Ear: External ear normal.      Nose: Nose normal.      Mouth/Throat:      Mouth: Mucous membranes are moist.      Pharynx: Oropharynx is clear.   Eyes:      Extraocular Movements: Extraocular movements intact.      Conjunctiva/sclera: Conjunctivae normal.      Pupils: Pupils are equal, round, and reactive to light.   Cardiovascular:      Rate and Rhythm: Normal rate and regular rhythm.      Pulses: Normal pulses.      Heart sounds: Normal heart sounds.   Pulmonary:      Effort: Pulmonary effort is normal.      Breath sounds: Normal breath sounds.   Abdominal:      General: Bowel  "sounds are normal.      Palpations: Abdomen is soft.   Musculoskeletal:         General: Normal range of motion.      Cervical back: Normal range of motion and neck supple.   Skin:     General: Skin is warm and dry.      Capillary Refill: Capillary refill takes less than 2 seconds.   Neurological:      General: No focal deficit present.      Mental Status: She is alert and oriented to person, place, and time.   Psychiatric:         Mood and Affect: Mood normal.         Behavior: Behavior normal.         Thought Content: Thought content normal.         Judgment: Judgment normal.         Procedures           ED Course        /68 (BP Location: Left arm, Patient Position: Sitting)   Pulse 97   Temp 98 °F (36.7 °C)   Resp 15   Ht 170.2 cm (67\")   Wt 73.5 kg (162 lb 0.6 oz)   LMP 06/10/2025   SpO2 98%   BMI 25.38 kg/m²   Labs Reviewed   PREGNANCY, URINE - Normal     Medications   ibuprofen (ADVIL,MOTRIN) tablet 800 mg (800 mg Oral Given 6/10/25 2322)     XR Shoulder 2+ View Right  Result Date: 6/11/2025  Impression: No acute osseous abnormality. Electronically Signed: John Issa MD  6/11/2025 12:25 AM EDT  Workstation ID: [id number]                                                   Medical Decision Making  Problems Addressed:  Acute pain of right shoulder: complicated acute illness or injury    Amount and/or Complexity of Data Reviewed  Radiology: ordered.    Risk  Prescription drug management.    Patient presents to the ED for the above complaint, underwent the above exam and workup.    Upon evaluation of patient, imaging was obtained.  Showed no acute abnormality.  Patient reports improvement in pain with Motrin.  Was advised to alternate Tylenol and ibuprofen as needed for any pain or discomfort, establish primary care, and follow-up with orthopedics if pain continues.  Patient is agreeable.    Patient was treated with the following medications while in the ED;   Medications   ibuprofen (ADVIL,MOTRIN) " tablet 800 mg (800 mg Oral Given 6/10/25 6326)     Consideration was given for admission, but the patient was stable for outpatient management as patient was ambulatory, nontoxic, stable, and afebrile.  Exam as above.    Disposition: Discussed need to follow-up diagnostics, including incidental findings.  Discharged with instructions to obtain outpatient follow-up with patient's symptoms and findings, with strict return precautions if patient develops new or worsening symptoms.    This document is intended for medical expert use only. Reading of this document by patients and/or patient's family without participating medical staff guidance may result in misinterpretation and unintended morbidity.  Any interpretation of such data is the responsibility of the patient and/or family member responsible for the patient in concert with their primary or specialist providers, not to be left for sources of online searches such as [employer], [employer] or similar queries. Relying on these approaches to knowledge may result in misinterpretation, misguided goals of care and even death should patients or family members try recommendations outside of the realm of professional medical care in a supervised inpatient environment.       Final diagnoses:   Acute pain of right shoulder       ED Disposition  ED Disposition       ED Disposition   Discharge    Condition   Stable    Comment   --               Diego Lua II, DO  [address] [place] [address] [phone]               Medication List        New Prescriptions      ibuprofen 800 MG tablet  Commonly known as: ADVIL,MOTRIN  Take 1 tablet by mouth Every 8 (Eight) Hours As Needed for Moderate Pain for up to 12 doses.               Where to Get Your Medications        These medications were sent to [facility] [address] [place] [facility] - [facility] [place] - [address] [facility] - [phone]  - [phone] FX  [address] [place] [address]      Phone: [phone]   ibuprofen 800 MG  tablet            Alicja Almeida, APRN  06/11/25 0404

## 2025-06-14 ENCOUNTER — APPOINTMENT (OUTPATIENT)
Dept: GENERAL RADIOLOGY | Facility: HOSPITAL | Age: 20
End: 2025-06-14
Payer: MEDICAID

## 2025-06-14 ENCOUNTER — HOSPITAL ENCOUNTER (EMERGENCY)
Facility: HOSPITAL | Age: 20
Discharge: LEFT BEFORE DISCHARGE | End: 2025-06-14
Attending: EMERGENCY MEDICINE
Payer: MEDICAID

## 2025-06-14 VITALS
SYSTOLIC BLOOD PRESSURE: 115 MMHG | HEART RATE: 86 BPM | HEIGHT: 67 IN | OXYGEN SATURATION: 98 % | TEMPERATURE: 97.4 F | RESPIRATION RATE: 24 BRPM | DIASTOLIC BLOOD PRESSURE: 71 MMHG | BODY MASS INDEX: 25.05 KG/M2 | WEIGHT: 159.6 LBS

## 2025-06-14 DIAGNOSIS — R11.2 NAUSEA AND VOMITING, UNSPECIFIED VOMITING TYPE: Primary | ICD-10-CM

## 2025-06-14 DIAGNOSIS — F41.0 PANIC ATTACK: ICD-10-CM

## 2025-06-14 LAB
ALBUMIN SERPL-MCNC: 5.3 G/DL (ref 3.5–5.2)
ALBUMIN/GLOB SERPL: 1.8 G/DL
ALP SERPL-CCNC: 68 U/L (ref 39–117)
ALT SERPL W P-5'-P-CCNC: 11 U/L (ref 1–33)
ANION GAP SERPL CALCULATED.3IONS-SCNC: 15.7 MMOL/L (ref 5–15)
AST SERPL-CCNC: 17 U/L (ref 1–32)
BASOPHILS # BLD AUTO: 0.05 10*3/MM3 (ref 0–0.2)
BASOPHILS NFR BLD AUTO: 0.6 % (ref 0–1.5)
BILIRUB SERPL-MCNC: 0.4 MG/DL (ref 0–1.2)
BUN SERPL-MCNC: 7.9 MG/DL (ref 6–20)
BUN/CREAT SERPL: 9.8 (ref 7–25)
CALCIUM SPEC-SCNC: 9.8 MG/DL (ref 8.6–10.5)
CHLORIDE SERPL-SCNC: 102 MMOL/L (ref 98–107)
CO2 SERPL-SCNC: 21.3 MMOL/L (ref 22–29)
CREAT SERPL-MCNC: 0.81 MG/DL (ref 0.57–1)
DEPRECATED RDW RBC AUTO: 38.5 FL (ref 37–54)
EGFRCR SERPLBLD CKD-EPI 2021: 106.7 ML/MIN/1.73
EOSINOPHIL # BLD AUTO: 0.53 10*3/MM3 (ref 0–0.4)
EOSINOPHIL NFR BLD AUTO: 5.9 % (ref 0.3–6.2)
ERYTHROCYTE [DISTWIDTH] IN BLOOD BY AUTOMATED COUNT: 11.9 % (ref 12.3–15.4)
GLOBULIN UR ELPH-MCNC: 2.9 GM/DL
GLUCOSE SERPL-MCNC: 140 MG/DL (ref 65–99)
HCT VFR BLD AUTO: 42.2 % (ref 34–46.6)
HGB BLD-MCNC: 14 G/DL (ref 12–15.9)
HOLD SPECIMEN: NORMAL
IMM GRANULOCYTES # BLD AUTO: 0.03 10*3/MM3 (ref 0–0.05)
IMM GRANULOCYTES NFR BLD AUTO: 0.3 % (ref 0–0.5)
LYMPHOCYTES # BLD AUTO: 2.47 10*3/MM3 (ref 0.7–3.1)
LYMPHOCYTES NFR BLD AUTO: 27.6 % (ref 19.6–45.3)
MCH RBC QN AUTO: 29.2 PG (ref 26.6–33)
MCHC RBC AUTO-ENTMCNC: 33.2 G/DL (ref 31.5–35.7)
MCV RBC AUTO: 87.9 FL (ref 79–97)
MONOCYTES # BLD AUTO: 0.76 10*3/MM3 (ref 0.1–0.9)
MONOCYTES NFR BLD AUTO: 8.5 % (ref 5–12)
NEUTROPHILS NFR BLD AUTO: 5.11 10*3/MM3 (ref 1.7–7)
NEUTROPHILS NFR BLD AUTO: 57.1 % (ref 42.7–76)
NRBC BLD AUTO-RTO: 0 /100 WBC (ref 0–0.2)
PLATELET # BLD AUTO: 332 10*3/MM3 (ref 140–450)
PMV BLD AUTO: 10.3 FL (ref 6–12)
POTASSIUM SERPL-SCNC: 3.8 MMOL/L (ref 3.5–5.2)
PROT SERPL-MCNC: 8.2 G/DL (ref 6–8.5)
RBC # BLD AUTO: 4.8 10*6/MM3 (ref 3.77–5.28)
SODIUM SERPL-SCNC: 139 MMOL/L (ref 136–145)
WBC NRBC COR # BLD AUTO: 8.95 10*3/MM3 (ref 3.4–10.8)

## 2025-06-14 PROCEDURE — 71045 X-RAY EXAM CHEST 1 VIEW: CPT

## 2025-06-14 PROCEDURE — 25810000003 LACTATED RINGERS SOLUTION: Performed by: NURSE PRACTITIONER

## 2025-06-14 PROCEDURE — 25010000002 DROPERIDOL PER 5 MG: Performed by: NURSE PRACTITIONER

## 2025-06-14 PROCEDURE — 80053 COMPREHEN METABOLIC PANEL: CPT | Performed by: NURSE PRACTITIONER

## 2025-06-14 PROCEDURE — 96374 THER/PROPH/DIAG INJ IV PUSH: CPT

## 2025-06-14 PROCEDURE — 85025 COMPLETE CBC W/AUTO DIFF WBC: CPT | Performed by: NURSE PRACTITIONER

## 2025-06-14 PROCEDURE — 99283 EMERGENCY DEPT VISIT LOW MDM: CPT

## 2025-06-14 RX ORDER — SODIUM CHLORIDE 0.9 % (FLUSH) 0.9 %
10 SYRINGE (ML) INJECTION AS NEEDED
Status: DISCONTINUED | OUTPATIENT
Start: 2025-06-14 | End: 2025-06-14 | Stop reason: HOSPADM

## 2025-06-14 RX ORDER — DROPERIDOL 2.5 MG/ML
2.5 INJECTION, SOLUTION INTRAMUSCULAR; INTRAVENOUS ONCE
Status: COMPLETED | OUTPATIENT
Start: 2025-06-14 | End: 2025-06-14

## 2025-06-14 RX ADMIN — DROPERIDOL 2.5 MG: 2.5 INJECTION, SOLUTION INTRAMUSCULAR; INTRAVENOUS at 02:52

## 2025-06-14 RX ADMIN — SODIUM CHLORIDE, POTASSIUM CHLORIDE, SODIUM LACTATE AND CALCIUM CHLORIDE 1000 ML: 600; 310; 30; 20 INJECTION, SOLUTION INTRAVENOUS at 02:52

## 2025-06-14 NOTE — ED PROVIDER NOTES
Subjective   History of Present Illness  Patient is a 19-year-old female who came in with nausea and vomiting stating that she felt like she was having an asthma attack she states she tried to use her inhaler at home but it was empty she does appear very anxious-she denies any shortness of breath at the time of my exam.      Review of Systems   Respiratory:  Positive for shortness of breath. Negative for wheezing.    Gastrointestinal:  Positive for nausea and vomiting.   Psychiatric/Behavioral:  The patient is nervous/anxious.        Past Medical History:   Diagnosis Date    Asthma        Allergies   Allergen Reactions    Penicillins Unknown - High Severity       No past surgical history on file.    No family history on file.    Social History     Socioeconomic History    Marital status: Single   Tobacco Use    Smoking status: Never    Smokeless tobacco: Never   Vaping Use    Vaping status: Former    Quit date: 6/19/2023   Substance and Sexual Activity    Alcohol use: Never    Drug use: Not Currently     Types: Marijuana    Sexual activity: Yes           Objective   Physical Exam  Vitals reviewed.   Constitutional:       Appearance: She is well-developed and normal weight.   HENT:      Head: Normocephalic and atraumatic.   Eyes:      Pupils: Pupils are equal, round, and reactive to light.   Cardiovascular:      Rate and Rhythm: Normal rate and regular rhythm.      Pulses: Normal pulses.   Pulmonary:      Effort: Pulmonary effort is normal.      Breath sounds: Normal breath sounds. No decreased breath sounds, wheezing, rhonchi or rales.   Musculoskeletal:      Cervical back: Neck supple.      Right lower leg: No tenderness. No edema.      Left lower leg: No tenderness. No edema.   Skin:     General: Skin is warm.      Capillary Refill: Capillary refill takes less than 2 seconds.   Neurological:      General: No focal deficit present.      Mental Status: She is alert.   Psychiatric:         Mood and Affect: Mood is  "anxious.         Behavior: Behavior normal.         Procedures           ED Course  ED Course as of 06/14/25 0335   Sat Jun 14, 2025   0302 Patient started crying and yelling out that she wanted to go home-patient was asking to leave so she was signed out AMA by nursing staff   [KW]      ED Course User Index  [KW] Caitlin Peralta, APRN                                         /71   Pulse 86   Temp 97.4 °F (36.3 °C)   Resp 24   Ht 170.2 cm (67\")   Wt 72.4 kg (159 lb 9.6 oz)   LMP 06/10/2025 (Exact Date)   SpO2 98%   BMI 25.00 kg/m²   Labs Reviewed   COMPREHENSIVE METABOLIC PANEL - Abnormal; Notable for the following components:       Result Value    Glucose 140 (*)     CO2 21.3 (*)     Albumin 5.3 (*)     Anion Gap 15.7 (*)     All other components within normal limits    Narrative:     GFR Categories in Chronic Kidney Disease (CKD)              GFR Category          GFR (mL/min/1.73)    Interpretation  G1                    90 or greater        Normal or high (1)  G2                    60-89                Mild decrease (1)  G3a                   45-59                Mild to moderate decrease  G3b                   30-44                Moderate to severe decrease  G4                    15-29                Severe decrease  G5                    14 or less           Kidney failure    (1)In the absence of evidence of kidney disease, neither GFR category G1 or G2 fulfill the criteria for CKD.    eGFR calculation 2021 CKD-EPI creatinine equation, which does not include race as a factor   CBC WITH AUTO DIFFERENTIAL - Abnormal; Notable for the following components:    RDW 11.9 (*)     Eosinophils, Absolute 0.53 (*)     All other components within normal limits   CBC AND DIFFERENTIAL    Narrative:     The following orders were created for panel order CBC & Differential.  Procedure                               Abnormality         Status                     ---------                               " -----------         ------                     CBC Auto Differential[[id number]]        Abnormal            Final result                 Please view results for these tests on the individual orders.   EXTRA TUBES    Narrative:     The following orders were created for panel order Extra Tubes.  Procedure                               Abnormality         Status                     ---------                               -----------         ------                     Gold Top - SST[[id number]]                                   Final result                 Please view results for these tests on the individual orders.   GOLD TOP - SST     Medications   sodium chloride 0.9 % flush 10 mL (has no administration in time range)   lactated ringers bolus 1,000 mL (0 mL Intravenous Stopped 6/14/25 0310)   droperidol (INAPSINE) injection 2.5 mg (2.5 mg Intravenous Given 6/14/25 0252)     XR Chest 1 View  Result Date: 6/14/2025  Impression: No acute cardiopulmonary process. Electronically Signed: Jinny Mckay MD  6/14/2025 3:10 AM EDT  Workstation ID: [id number]                  Medical Decision Making  Patient is a 19-year female who is very anxious on my exam who had active nausea and vomiting while presents mentation to the emergency room she had IV established and blood work was obtained and -results were not available prior to the patient leaving AGAINST MEDICAL ADVICE    Problems Addressed:  Nausea and vomiting, unspecified vomiting type: complicated acute illness or injury  Panic attack: complicated acute illness or injury    Amount and/or Complexity of Data Reviewed  Labs: ordered.  Radiology: ordered.    Risk  Prescription drug management.        Final diagnoses:   Nausea and vomiting, unspecified vomiting type   Panic attack       ED Disposition  ED Disposition       ED Disposition   AMA    Condition   --    Comment   --               No follow-up provider specified.       Medication List      No changes were made to your  prescriptions during this visit.            Caitlin Peralta, APRN  06/14/25 033

## 2025-07-03 ENCOUNTER — HOSPITAL ENCOUNTER (EMERGENCY)
Facility: HOSPITAL | Age: 20
Discharge: HOME | End: 2025-07-03
Payer: MEDICAID

## 2025-07-03 VITALS
TEMPERATURE: 98.2 F | DIASTOLIC BLOOD PRESSURE: 69 MMHG | HEART RATE: 95 BPM | OXYGEN SATURATION: 99 % | BODY MASS INDEX: 25.67 KG/M2 | RESPIRATION RATE: 18 BRPM | WEIGHT: 163.58 LBS | SYSTOLIC BLOOD PRESSURE: 114 MMHG | HEIGHT: 67 IN

## 2025-07-03 DIAGNOSIS — H92.02 LEFT EAR PAIN: ICD-10-CM

## 2025-07-03 DIAGNOSIS — H66.92 LEFT OTITIS MEDIA, UNSPECIFIED OTITIS MEDIA TYPE: Primary | ICD-10-CM

## 2025-07-03 PROCEDURE — 99283 EMERGENCY DEPT VISIT LOW MDM: CPT

## 2025-07-03 RX ORDER — AZITHROMYCIN 250 MG/1
500 TABLET, FILM COATED ORAL ONCE
Status: COMPLETED | OUTPATIENT
Start: 2025-07-03 | End: 2025-07-03

## 2025-07-03 RX ORDER — HYDROCODONE BITARTRATE AND ACETAMINOPHEN 5; 325 MG/1; MG/1
1 TABLET ORAL ONCE
Refills: 0 | Status: COMPLETED | OUTPATIENT
Start: 2025-07-03 | End: 2025-07-03

## 2025-07-03 RX ORDER — AZITHROMYCIN 250 MG/1
TABLET, FILM COATED ORAL
Qty: 6 TABLET | Refills: 0 | Status: SHIPPED | OUTPATIENT
Start: 2025-07-03 | End: 2025-07-04

## 2025-07-03 RX ORDER — IBUPROFEN 800 MG/1
800 TABLET, FILM COATED ORAL EVERY 8 HOURS PRN
Qty: 9 TABLET | Refills: 0 | Status: SHIPPED | OUTPATIENT
Start: 2025-07-03 | End: 2025-07-04

## 2025-07-03 RX ADMIN — AZITHROMYCIN 500 MG: 250 TABLET, FILM COATED ORAL at 21:53

## 2025-07-03 RX ADMIN — HYDROCODONE BITARTRATE AND ACETAMINOPHEN 1 TABLET: 5; 325 TABLET ORAL at 21:53

## 2025-07-04 ENCOUNTER — HOSPITAL ENCOUNTER (EMERGENCY)
Facility: HOSPITAL | Age: 20
Discharge: HOME | End: 2025-07-05
Payer: MEDICAID

## 2025-07-04 VITALS
SYSTOLIC BLOOD PRESSURE: 103 MMHG | RESPIRATION RATE: 18 BRPM | HEIGHT: 67 IN | TEMPERATURE: 99 F | DIASTOLIC BLOOD PRESSURE: 73 MMHG | OXYGEN SATURATION: 95 % | HEART RATE: 88 BPM | WEIGHT: 164.46 LBS | BODY MASS INDEX: 25.81 KG/M2

## 2025-07-04 DIAGNOSIS — H60.502 ACUTE OTITIS EXTERNA OF LEFT EAR, UNSPECIFIED TYPE: Primary | ICD-10-CM

## 2025-07-04 DIAGNOSIS — J02.9 VIRAL PHARYNGITIS: ICD-10-CM

## 2025-07-04 DIAGNOSIS — H92.02 OTALGIA OF LEFT EAR: ICD-10-CM

## 2025-07-04 LAB — S PYO AG THROAT QL: NEGATIVE

## 2025-07-04 PROCEDURE — 87651 STREP A DNA AMP PROBE: CPT | Performed by: NURSE PRACTITIONER

## 2025-07-04 PROCEDURE — 99283 EMERGENCY DEPT VISIT LOW MDM: CPT

## 2025-07-04 RX ORDER — CIPROFLOXACIN AND DEXAMETHASONE 3; 1 MG/ML; MG/ML
4 SUSPENSION/ DROPS AURICULAR (OTIC) 2 TIMES DAILY
Status: DISCONTINUED | OUTPATIENT
Start: 2025-07-04 | End: 2025-07-05 | Stop reason: HOSPADM

## 2025-07-04 RX ORDER — IBUPROFEN 800 MG/1
800 TABLET, FILM COATED ORAL EVERY 8 HOURS PRN
Qty: 20 TABLET | Refills: 0 | Status: SHIPPED | OUTPATIENT
Start: 2025-07-04

## 2025-07-04 RX ORDER — IBUPROFEN 600 MG/1
600 TABLET, FILM COATED ORAL ONCE
Status: COMPLETED | OUTPATIENT
Start: 2025-07-04 | End: 2025-07-04

## 2025-07-04 RX ADMIN — CIPROFLOXACIN AND DEXAMETHASONE 4 DROP: 3; 1 SUSPENSION/ DROPS AURICULAR (OTIC) at 22:33

## 2025-07-04 RX ADMIN — IBUPROFEN 600 MG: 600 TABLET ORAL at 22:32

## 2025-07-04 NOTE — DISCHARGE INSTRUCTIONS
Rest.  Drink plenty of fluids.  Take medications as prescribed to completion.  Follow-up with [facility], call Monday to schedule an appointment for utilize the walk-in hours on Tuesday.  Patient connection has been provided to you to establish primary care provider, call Monday for follow-up.  Return to the ER for any new or worsening symptoms.

## 2025-07-04 NOTE — ED PROVIDER NOTES
Subjective   History of Present Illness  Chief complaint: Ear pain x 2 days      Context: Patient is a 19-year-old female with a history of asthma who presents to the ER with complaint of left ear pain that radiates down into her left side of her throat 2 days.  [place] on [place], stated she started having the ear pain on Tuesday.  Patient denies any fever, chest pain, shortness of air, abdominal pain, nausea/vomiting/diarrhea.        PCP: None.    LMP: 6/10/2025          Review of Systems   Constitutional:  Negative for fever.   HENT:  Positive for ear discharge, ear pain and sore throat. Negative for facial swelling, hearing loss and trouble swallowing.        Past Medical History:   Diagnosis Date    Asthma        Allergies   Allergen Reactions    Penicillins Unknown - High Severity       No past surgical history on file.    No family history on file.    Social History     Socioeconomic History    Marital status: Single   Tobacco Use    Smoking status: Never    Smokeless tobacco: Never   Vaping Use    Vaping status: Former    Quit date: 6/19/2023   Substance and Sexual Activity    Alcohol use: Never    Drug use: Not Currently     Types: Marijuana    Sexual activity: Yes           Objective   Physical Exam  Vitals and nursing note reviewed.   Constitutional:       Appearance: Normal appearance.   HENT:      Head: Normocephalic.      Right Ear: Hearing and tympanic membrane normal.      Left Ear: Drainage and tenderness present. No hemotympanum. Tympanic membrane is erythematous.      Ears:      Comments: Purulent drainage and tenderness noted to the left TM.  No hemotympanum noted, tympanic membrane is erythematous.     Nose: Nose normal.      Mouth/Throat:      Mouth: Mucous membranes are moist.   Eyes:      Extraocular Movements: Extraocular movements intact.   Neck:      Comments: Right cervical adenopathy noted.  Cardiovascular:      Rate and Rhythm: Normal  "rate and regular rhythm.      Pulses: Normal pulses.      Heart sounds: Normal heart sounds.   Pulmonary:      Effort: Pulmonary effort is normal.      Breath sounds: Normal breath sounds. No wheezing.   Abdominal:      Palpations: Abdomen is soft.      Tenderness: There is no abdominal tenderness.   Musculoskeletal:         General: Normal range of motion.      Cervical back: Normal range of motion.   Lymphadenopathy:      Cervical: Cervical adenopathy present.   Skin:     General: Skin is warm and dry.      Capillary Refill: Capillary refill takes less than 2 seconds.   Neurological:      General: No focal deficit present.      Mental Status: She is alert and oriented to person, place, and time.   Psychiatric:         Mood and Affect: Mood normal.         Behavior: Behavior normal.         Procedures           ED Course          /69 (BP Location: Left arm, Patient Position: Sitting)   Pulse 95   Temp 98.2 °F (36.8 °C) (Oral)   Resp 18   Ht 170.2 cm (67\")   Wt 74.2 kg (163 lb 9.3 oz)   LMP 06/10/2025 (Exact Date)   SpO2 99%   BMI 25.62 kg/m²   Labs Reviewed - No data to display  Medications   HYDROcodone-acetaminophen (NORCO) 5-325 MG per tablet 1 tablet (1 tablet Oral Given 7/3/25 2153)   azithromycin (ZITHROMAX) tablet 500 mg (500 mg Oral Given 7/3/25 2153)     No radiology results for the last day                                               Medical Decision Making  Radiology was considered but was not emergently warranted at this time.      Labs were considered but were not emergently warranted at this time.    EKG considered was not emergently warranted at this time.    Patient is a 19-year-old female with a history of asthma who presents to the ER for above complaint.  On initial examination patient was resting comfortably in the bed, nontoxic in appearance, with no signs and symptoms of distress but in visual pain.  Physical exam revealed left ear erythematous with drainage, TM injected, left " cervical adenopathy noted, mild tenderness to the posterior but no swelling noted, heart regular rate and rhythm, lungs clear and equal bilaterally in auscultation, no tenderness to abdomen on palpation. Patient was given p.o. Norco for pain and due to patient's penicillin allergy, patient was given p.o. azithromycin for otitis media.  Discussed findings and decision to discharge.  Advised patient to rest, drink plenty of fluids, take medications as prescribed to completion.  Follow-up with [facility], and to call Monday to schedule an appointment or utilize their walk-in hours on Tuesday.  Advised to follow-up with your primary care and return to the ER for any new or worsening symptoms.  Patient verbalized understanding of all discharge instructions.    Appropriate PPE worn during exam.    i discussed findings with patient who voices understanding of discharge instructions, signs and symptoms requiring return to ED; discharged improved and in stable condition with follow up for re-evaluation.  This document is intended for medical expert use only. Reading of this document by patients and/or patient's family without participating medical staff guidance may result in misinterpretation and unintended morbidity.  Any interpretation of such data is the responsibility of the patient and/or family member responsible for the patient in concert with their primary or specialist providers, not to be left for sources of online searches such as [employer], [employer]erve or similar queries. Relying on these approaches to knowledge may result in misinterpretation, misguided goals of care and even death should patients or family members try recommendations outside of the realm of professional medical care in a supervised inpatient environment.         Problems Addressed:  Left ear pain: complicated acute illness or injury  Left otitis media, unspecified otitis media type: complicated acute illness or injury    Risk  Prescription drug  management.        Final diagnoses:   Left otitis media, unspecified otitis media type   Left ear pain       ED Disposition  ED Disposition       ED Disposition   Discharge    Condition   Stable    Comment   --               [facility]  [address]  [place] [id number]  [phone]  Schedule an appointment as soon as possible for a visit   Call Monday to schedule an appointment for follow-up or utilize the walk-in hours on Tuesday.    [facility]  [place] [id number]  [phone]  Schedule an appointment as soon as possible for a visit   Call tomorrow to establish a primary care provider for follow-up.         Medication List        Changed      ibuprofen 800 MG tablet  Commonly known as: ADVIL,MOTRIN  Take 1 tablet by mouth Every 8 (Eight) Hours As Needed for Mild Pain.  What changed: reasons to take this               Where to Get Your Medications        These medications were sent to [facility] [id number] [facility] - [place], [place] - [address] [phone] [facility] [phone] FX  [address] [place] [address]      Phone: [phone]   azithromycin 250 MG tablet  ibuprofen 800 MG tablet            Taylor Lino, APRN  07/03/25 1804

## 2025-07-04 NOTE — Clinical Note
[facility] EMERGENCY DEPARTMENT  [address] [place] [address]  Phone: [phone]    Tio Eng was seen and treated in our emergency department on 7/4/2025.  She may return to work on 07/06/2025.         Thank you for choosing [facility].    Caitlin Peralta, ENOCH

## 2025-07-05 NOTE — DISCHARGE INSTRUCTIONS
Use Ciprodex 4 drops in the left ear 2 times daily for 10 days    Use Tylenol and Motrin as needed for discomfort    Use Cepacol throat lozenges to help with the sore throat or Chloraseptic spray    Return for difficulty breathing or swallow    See primary care if symptoms do not improve or return to the ER for worsening symptom

## 2025-07-05 NOTE — ED PROVIDER NOTES
Subjective   History of Present Illness  Patient is a 19-year-old female who comes in with sore throat for 3 days and also complaining of left ear pain [place]    She has no fever no chills she has pain with swallowing but no difficulty no drooling no trismus      Review of Systems   HENT:  Positive for ear pain and sore throat.        Past Medical History:   Diagnosis Date    Asthma        Allergies   Allergen Reactions    Penicillins Unknown - High Severity       No past surgical history on file.    No family history on file.    Social History     Socioeconomic History    Marital status: Single   Tobacco Use    Smoking status: Never    Smokeless tobacco: Never   Vaping Use    Vaping status: Former    Quit date: 6/19/2023   Substance and Sexual Activity    Alcohol use: Never    Drug use: Not Currently     Types: Marijuana    Sexual activity: Yes           Objective   Physical Exam  Vitals reviewed.   Constitutional:       Appearance: She is well-developed.   HENT:      Head: Normocephalic and atraumatic.      Right Ear: Hearing, tympanic membrane, ear canal and external ear normal.      Left Ear: Decreased hearing noted. Drainage, swelling and tenderness present.      Ears:      Comments: Left external canal swollen red with drainage. Foul smell     Mouth/Throat:      Lips: Pink.      Mouth: Mucous membranes are moist.      Pharynx: Oropharynx is clear. Uvula midline. Posterior oropharyngeal erythema present. No oropharyngeal exudate or uvula swelling.      Tonsils: No tonsillar exudate or tonsillar abscesses.   Eyes:      Conjunctiva/sclera: Conjunctivae normal.      Pupils: Pupils are equal, round, and reactive to light.   Cardiovascular:      Rate and Rhythm: Normal rate and regular rhythm.      Heart sounds: Normal heart sounds.   Pulmonary:      Effort: Pulmonary effort is normal. No respiratory distress.      Breath sounds: Normal breath sounds. No wheezing.   Abdominal:      General: Bowel  "sounds are normal.      Palpations: Abdomen is soft.      Tenderness: There is no abdominal tenderness.   Musculoskeletal:         General: No deformity. Normal range of motion.      Cervical back: Normal range of motion and neck supple.   Skin:     General: Skin is warm and dry.      Capillary Refill: Capillary refill takes less than 2 seconds.   Neurological:      Mental Status: She is alert and oriented to person, place, and time.      Motor: No weakness.   Psychiatric:         Mood and Affect: Mood normal.         Behavior: Behavior normal.         Procedures           ED Course        /70   Pulse 76   Temp 99 °F (37.2 °C) (Oral)   Resp 18   Ht 170.2 cm (67\")   Wt 74.6 kg (164 lb 7.4 oz)   LMP 06/10/2025 (Exact Date)   SpO2 98%   BMI 25.76 kg/m²   Labs Reviewed   RAPID STREP A SCREEN - Normal     Medications   ciprofloxacin-dexAMETHasone (CIPRODEX) 0.3-0.1 % otic suspension 4 drop (4 drops Left Ear Given 7/4/25 2233)   ibuprofen (ADVIL,MOTRIN) tablet 600 mg (600 mg Oral Given 7/4/25 2232)     No radiology results for the last day                                                 Medical Decision Making  Patient is a 19-year-old with left ear pain and drainage after [place] as well as sore throat for three days.  She had above exam and was found to be strep negative she was found to have a left otitis externa and was treated with Ciprodex drops here in the emergency room and discharged home with instructions to use the drops 4 drops in the left ear 2 times a day for 10 days  She was advised use Tylenol and Motrin as well as Cepacol throat lozenges and/or Chloraseptic spray and to return for any difficulty breathing or swallowing    Problems Addressed:  Acute otitis externa of left ear, unspecified type: complicated acute illness or injury  Otalgia of left ear: complicated acute illness or injury  Viral pharyngitis: complicated acute illness or injury    Amount and/or Complexity of Data " Reviewed  Labs: ordered. Decision-making details documented in ED Course.  ECG/medicine tests: ordered and independent interpretation performed. Decision-making details documented in ED Course.    Risk  OTC drugs.  Prescription drug management.        Final diagnoses:   Acute otitis externa of left ear, unspecified type   Otalgia of left ear   Viral pharyngitis       ED Disposition  ED Disposition       ED Disposition   Discharge    Condition   Stable    Comment   --               [facility]  [phone]  Call in 3 days           Medication List        Stop      albuterol (2.5 MG/3ML) 0.083% nebulizer solution  Commonly known as: PROVENTIL     albuterol sulfate  (90 Base) MCG/ACT inhaler  Commonly known as: PROVENTIL HFA;VENTOLIN HFA;PROAIR HFA     azithromycin 250 MG tablet  Commonly known as: Zithromax Z-Santos     brompheniramine-pseudoephedrine-DM 30-2-10 MG/5ML syrup     guaiFENesin 600 MG 12 hr tablet  Commonly known as: MUCINEX     hydrOXYzine 25 MG tablet  Commonly known as: ATARAX     methylPREDNISolone 4 MG dose pack  Commonly known as: MEDROL     ondansetron ODT 4 MG disintegrating tablet  Commonly known as: ZOFRAN-ODT     phenazopyridine 200 MG tablet  Commonly known as: PYRIDIUM     predniSONE 20 MG tablet  Commonly known as: DELTASONE     sulfamethoxazole-trimethoprim 800-160 MG per tablet  Commonly known as: BACTRIM DS,SEPTRA DS     Unable to find               Where to Get Your Medications        These medications were sent to [facility] [id number] [facility] - [facility] [place] - [address] [phone] [place] [phone] FX  [address] [place] [address]      Phone: [phone]   ibuprofen 800 MG tablet            Caitlin Peralta, APRKAMILA  07/04/25 5722

## 2025-07-05 NOTE — ED NOTES
Pt was seen here yesterday and was diagnosed with a left ear infection. Pt was prescribed an antibiotic but has not been able to pick it up yet due to the pharmacy being closed. Pt states the pain in her ear and throat has gotten worse.